# Patient Record
Sex: MALE | Race: WHITE
[De-identification: names, ages, dates, MRNs, and addresses within clinical notes are randomized per-mention and may not be internally consistent; named-entity substitution may affect disease eponyms.]

---

## 2021-04-30 ENCOUNTER — HOSPITAL ENCOUNTER (EMERGENCY)
Dept: HOSPITAL 12 - ER | Age: 31
Discharge: HOME | End: 2021-04-30
Payer: SELF-PAY

## 2021-04-30 VITALS — DIASTOLIC BLOOD PRESSURE: 67 MMHG | SYSTOLIC BLOOD PRESSURE: 122 MMHG

## 2021-04-30 VITALS — HEIGHT: 73 IN | BODY MASS INDEX: 24.78 KG/M2 | WEIGHT: 187 LBS

## 2021-04-30 DIAGNOSIS — J40: Primary | ICD-10-CM

## 2021-04-30 DIAGNOSIS — Z20.822: ICD-10-CM

## 2021-04-30 PROCEDURE — A4663 DIALYSIS BLOOD PRESSURE CUFF: HCPCS

## 2021-04-30 NOTE — NUR
Patient discharged to home in stable condition.  Written and verbal after care 
instructions given. 

Patient verbalizes understanding of instructions. Stressed follow up or return 
to ER for worsening s/s.

Belongings with patient. VSS. Steady gait.